# Patient Record
Sex: FEMALE | Race: WHITE | NOT HISPANIC OR LATINO | URBAN - NONMETROPOLITAN AREA
[De-identification: names, ages, dates, MRNs, and addresses within clinical notes are randomized per-mention and may not be internally consistent; named-entity substitution may affect disease eponyms.]

---

## 2017-12-18 ENCOUNTER — APPOINTMENT (RX ONLY)
Dept: URBAN - NONMETROPOLITAN AREA CLINIC 27 | Facility: CLINIC | Age: 57
Setting detail: DERMATOLOGY
End: 2017-12-18

## 2017-12-18 DIAGNOSIS — L71.0 PERIORAL DERMATITIS: ICD-10-CM | Status: INADEQUATELY CONTROLLED

## 2017-12-18 PROCEDURE — 99213 OFFICE O/P EST LOW 20 MIN: CPT

## 2017-12-18 PROCEDURE — ? PRESCRIPTION

## 2017-12-18 PROCEDURE — ? COUNSELING

## 2017-12-18 PROCEDURE — ? TREATMENT REGIMEN

## 2017-12-18 RX ORDER — MINOCYCLINE HYDROCHLORIDE 90 MG/1
CAPSULE, EXTENDED RELEASE ORAL
Qty: 30 | Refills: 1 | Status: ERX | COMMUNITY
Start: 2017-12-18

## 2017-12-18 RX ORDER — METRONIDAZOLE 7.5 MG/G
GEL TOPICAL
Qty: 1 | Refills: 1 | Status: ERX

## 2017-12-18 RX ADMIN — MINOCYCLINE HYDROCHLORIDE: 90 CAPSULE, EXTENDED RELEASE ORAL at 00:00

## 2017-12-18 ASSESSMENT — LOCATION SIMPLE DESCRIPTION DERM
LOCATION SIMPLE: LEFT CHEEK
LOCATION SIMPLE: CHIN
LOCATION SIMPLE: RIGHT CHEEK
LOCATION SIMPLE: UPPER LIP
LOCATION SIMPLE: LEFT CHEEK

## 2017-12-18 ASSESSMENT — LOCATION DETAILED DESCRIPTION DERM
LOCATION DETAILED: LEFT CENTRAL MALAR CHEEK
LOCATION DETAILED: LEFT MEDIAL BUCCAL CHEEK
LOCATION DETAILED: PHILTRUM
LOCATION DETAILED: RIGHT MEDIAL BUCCAL CHEEK
LOCATION DETAILED: RIGHT MENTAL CREASE

## 2017-12-18 ASSESSMENT — LOCATION ZONE DERM
LOCATION ZONE: FACE
LOCATION ZONE: FACE
LOCATION ZONE: LIP

## 2017-12-18 ASSESSMENT — INVESTIGATOR STATIC GLOBAL ASSESSMENT: IN YOUR EXPERIENCE, AMONG ALL PATIENTS YOU HAVE SEEN WITH THIS CONDITION, HOW SEVERE IS THIS PATIENT'S CONDITION?: MILD

## 2017-12-18 NOTE — PROCEDURE: TREATMENT REGIMEN
Initiate Treatment: Minocycline ER 90mg once daily for 2 months\\nmetrogel apply once daily
Otc Regimen: Dove, cetaphil, or CeraVe wash in the morning
Plan: Samples given until prescriptions from genrx come in due to the holidays.
Detail Level: Zone
Samples Given: Cetaphil \\Britney Ls wash face daily\\nSolodyn 80mg \\nNoritate cream apply daily at bedtime

## 2019-08-12 ENCOUNTER — APPOINTMENT (RX ONLY)
Dept: URBAN - NONMETROPOLITAN AREA CLINIC 27 | Facility: CLINIC | Age: 59
Setting detail: DERMATOLOGY
End: 2019-08-12

## 2019-08-12 DIAGNOSIS — L71.0 PERIORAL DERMATITIS: ICD-10-CM

## 2019-08-12 DIAGNOSIS — L29.89 OTHER PRURITUS: ICD-10-CM | Status: INADEQUATELY CONTROLLED

## 2019-08-12 DIAGNOSIS — L91.8 OTHER HYPERTROPHIC DISORDERS OF THE SKIN: ICD-10-CM

## 2019-08-12 DIAGNOSIS — L29.8 OTHER PRURITUS: ICD-10-CM | Status: INADEQUATELY CONTROLLED

## 2019-08-12 PROBLEM — L30.9 DERMATITIS, UNSPECIFIED: Status: ACTIVE | Noted: 2019-08-12

## 2019-08-12 PROCEDURE — ? COUNSELING

## 2019-08-12 PROCEDURE — ? TREATMENT REGIMEN

## 2019-08-12 PROCEDURE — ? PRESCRIPTION

## 2019-08-12 PROCEDURE — 99213 OFFICE O/P EST LOW 20 MIN: CPT

## 2019-08-12 RX ORDER — DOXYCYCLINE 50 MG/1
TABLET, FILM COATED ORAL
Qty: 90 | Refills: 1 | Status: ERX | COMMUNITY
Start: 2019-08-12

## 2019-08-12 RX ORDER — SALICYLIC ACID 3 G/100G
LOTION TOPICAL
Qty: 60 | Refills: 0 | Status: ERX | COMMUNITY
Start: 2019-08-12

## 2019-08-12 RX ORDER — IVERMECTIN 10 MG/G
CREAM TOPICAL
Qty: 1 | Refills: 1 | Status: ERX | COMMUNITY
Start: 2019-08-12

## 2019-08-12 RX ORDER — RANITIDINE 300 MG/1
TABLET, FILM COATED ORAL
Qty: 30 | Refills: 0 | Status: ERX | COMMUNITY
Start: 2019-08-12

## 2019-08-12 RX ADMIN — RANITIDINE: 300 TABLET, FILM COATED ORAL at 12:51

## 2019-08-12 RX ADMIN — IVERMECTIN: 10 CREAM TOPICAL at 12:53

## 2019-08-12 RX ADMIN — DOXYCYCLINE: 50 TABLET, FILM COATED ORAL at 12:42

## 2019-08-12 RX ADMIN — SALICYLIC ACID: 3 LOTION TOPICAL at 12:51

## 2019-08-12 ASSESSMENT — LOCATION DETAILED DESCRIPTION DERM
LOCATION DETAILED: RIGHT AXILLARY VAULT
LOCATION DETAILED: LEFT AXILLARY VAULT

## 2019-08-12 ASSESSMENT — LOCATION SIMPLE DESCRIPTION DERM
LOCATION SIMPLE: LEFT AXILLARY VAULT
LOCATION SIMPLE: RIGHT AXILLARY VAULT

## 2019-08-12 ASSESSMENT — LOCATION ZONE DERM: LOCATION ZONE: AXILLAE

## 2019-08-12 NOTE — PROCEDURE: TREATMENT REGIMEN
Detail Level: Detailed
Continue Regimen: Minicycline 90mg ER until finished then switch to Doxycycline 50mg once daily which is being prescribed today. Patient voices understanding
Initiate Treatment: Doxycycline 50mg, once daily x 90 days \\nTriple rosacea cream (azelaic acid, ivermectin, metronidazole) compound from [facility], apply to full face once daily x 90 days then as needed for flares
Plan: Discussed risks and benefits of removing skin tags by local anesthesia and cautery. Advised patient to wait until winter months to treat. Patient voices understanding
Detail Level: Zone
Initiate Treatment: Allegra 180mg, 8am and 3pm\\nZyrtec, QHS\\nRanitidine 300mg, take at 12 noon \\n\\nAdvised patient to follow this treatment regimen x 1 month

## 2021-12-14 DIAGNOSIS — Z01.419 ROUTINE GYNECOLOGICAL EXAMINATION: Primary | ICD-10-CM

## 2022-03-22 RX ORDER — ESCITALOPRAM OXALATE 20 MG/1
20 TABLET ORAL DAILY
COMMUNITY
End: 2023-04-10

## 2022-03-22 RX ORDER — LEVOTHYROXINE SODIUM 112 UG/1
112 TABLET ORAL
COMMUNITY

## 2022-03-22 RX ORDER — VERAPAMIL HYDROCHLORIDE 120 MG/1
120 TABLET, FILM COATED, EXTENDED RELEASE ORAL NIGHTLY
COMMUNITY
End: 2023-04-10

## 2022-03-22 NOTE — PROGRESS NOTES
"NEW PATIENT-  WELL WOMAN (menop/ hyst with BSO)  Patient Care Team:  Donte Thomas MD as PCP - General (Family Medicine)    HPI:  Patient is a 61 y.o. who presents for her well woman exam today.  History reviewed with patient.   Patient also has additional concerns she wants to discuss at this visit (see additional problem note below)    Her hyst was for bleeding in "years ago"  HRT:  has used sytemic estrogen hrt in the pastand testosterone and BioTe  HX ABNL PAPS: had cryo yrs ago but normal since    REVIEW OF PRIOR DATA/ HEALTH MAINTENANCE:  LAST ANNUAL/ [place]   [place]   LAST LABS- in the last year with pcp in [place]- thinks they checked thyroid and cholesterol   LAST MMG (screening)- -- normal at out of town facility in [place]    LAST COLONOSCOPY- in her late 30s but declines doing again as when her  did his his heart stopped so she is too anxious to do it again herself. Discussed Cologard and would like to do that and if it is positive will strongly consider colonoscopy       Past Medical History:   Diagnosis Date    Anemia     Anosmia     ever since Covid in dec 2020    Anxiety and depression     Benign paroxysmal vertigo, bilateral     Current smoker     Family history of breast cancer gene mutation in first degree relative     mom +mutation, breast ca at 32, mat aunt + for gene, breast ca at 40, sister neg for gene but +breast ca-patient has never been tested    history of endometriosis     Hypothyroid 3/23/2022    Hypothyroidism     Migraines     exertional-if she doesnt move too quickly she does fine off her verapamil     SURGICAL HX:   has a past surgical history that includes Cholecystectomy; Hernia repair; Bladder suspension; and Total abdominal hysterectomy w/ bilateral salpingoophorectomy.    SOCIAL HX:    reports that she has been smoking. She has never used smokeless tobacco. She reports current alcohol use. She reports that she does not use drugs.    FAMILY HX:   " family history includes Breast cancer in her sister; Breast cancer (age of onset: 32) in her mother; Breast cancer (age of onset: 40) in her maternal aunt; Esophageal cancer in her father. .    ALLERGIES:  Patient has no known allergies.    Current Outpatient Medications:     ascorbic acid, vitamin C, (VITAMIN C) 1000 MG tablet, Take 1,000 mg by mouth once daily., Disp: , Rfl:     cyanocobalamin, vitamin B-12, (B-12 COMPLIANCE) 1,000 mcg/mL Kit, , Disp: , Rfl:     EScitalopram oxalate (LEXAPRO) 20 MG tablet, Take 20 mg by mouth once daily., Disp: , Rfl:     etodolac (LODINE) 400 MG tablet, Take 400 mg by mouth 2 (two) times daily., Disp: , Rfl:     fluticasone propionate (FLONASE) 50 mcg/actuation nasal spray, 1 spray by Each Nostril route once daily., Disp: , Rfl:     levothyroxine (SYNTHROID) 112 MCG tablet, Take 112 mcg by mouth before breakfast., Disp: , Rfl:     melatonin 10 mg Tab, Take by mouth., Disp: , Rfl:     vitamin D (VITAMIN D3) 1000 units Tab, Take 1,000 Units by mouth once daily., Disp: , Rfl:     zinc gluconate 50 mg tablet, Take 50 mg by mouth once daily., Disp: , Rfl:     clotrimazole-betamethasone 1-0.05% (LOTRISONE) cream, Apply topically 2 (two) times daily. for 14 days, Disp: 45 g, Rfl: 1    fluconazole (DIFLUCAN) 200 MG Tab, Take 1 tablet (200 mg total) by mouth once daily. for 3 days, Disp: 3 tablet, Rfl: 0    loratadine (CLARITIN) 10 mg tablet, , Disp: , Rfl:     verapamiL (CALAN-SR) 120 MG CR tablet, Take 120 mg by mouth every evening., Disp: , Rfl:     ROS:  CONST:  No fever, chills, or unexpected changes in weight,  +hair loss +fatigue +excessive sleepiness  CV: No chest pain or palpitations  RESP:  No shortness of breath or cough  GI: No abd pain, vomiting, diarrhea, blood in stool, or changes in bowel mvmts  SKIN: No rashes or lesions  MUSCULOSKELETAL: No joint swelling or pain but +myalgias and + pain in hips and feet if she is active  PSYCH: No changes in mood or  "insomia, +mild depression symptoms  BREASTS: No asymmetry, lumps, pain, nipple discharge, or skin changes  :  No dysuria, urgency, frequency, hematuria or incontinence          No vag dc, itching, odor or dryness          No pelvic pain, dyspareunia, or abnormal vaginal bleeding    VITALS:  Blood pressure 117/76, pulse 79, height 5' 8" (1.727 m), weight 119.9 kg (264 lb 6.4 oz).  Body mass index is 40.2 kg/m².     PHYSICAL EXAM-  APPEARANCE: Well appearing, in no acute distress.  NECK: Neck symmetric without masses or thyromegaly.  CV:  Normal rate  PULM: Normal resp rate, no resp distress, normal resp effort  PSYCH:  Normal mood and affect, cooperative  SKIN: No rashes, lesions, or abnormal bruising  LYMPH: No inguinal or axillary adenopathy  ABD: Soft, without tenderness or masses. No palpable hernias or hsm  BREASTS: Symmetrical, no skin changes or lesions. No palpable masses, tenderness, or nipple dc  PELVIC:  VULVA: No lesions. Normal female genitalia.  URETHRAL MEATUS: No masses, no prolapse.  BLADDER/ URETHRA: No masses or suprapubic tenderness  VAGINA/ CUFF: No discharge, erythema, or lesions. No significant cystocele/ rectocele. +atrophic changes  PELVIS: No masses, tenderness, or fullness on bimanual exam  ANUS/ PERINEUM: Normal tone.  No lesions.  *female chaperone present for entire exam    ASSESSMENT and PLAN:  Encounter for well woman exam with routine gynecological exam  -     Liquid-based pap smear, screening    Breast cancer screening by mammogram  -     Mammo Digital Screening Bilat w/ Barney; Future; Expected date: 04/06/2022    Menopausal state    Routine gynecological examination  -     Ambulatory referral/consult to Obstetrics / Gynecology    Screening for colon cancer  -     Cologuard Screening (Multitarget Stool DNA); Future; Expected date: 04/06/2022    FOLLOWUP:  1 year for wwe or sooner prn    COUNSELING:  Patient was counseled today on recommendations for yearly pelvic exam, current Pap " guidelines, self breast exams, annual screening mammograms, routine screening colonoscopy, and screening bone density. Reviewed calcium and vitamin D supplements and weight bearing exercise to minimize risks.  Encouraged patient to see her PCP for other health maintenance.     PROBLEM VISIT:  Problem HPI/ROS:              -itching of vulvar area for last several weeks , has been sweating a lot   - patient's mom and aunt with breast ca gene, sister neg but got breast ca at 40. Pt has never been tested because she thought she was doing everything possible for prevention by getting her yearly mmgs.Discussed multiple other additional means to decrease risk if she is positive and gave [facility] info and pt will consider    comp cream at [facility] estratest   -Fatigue and depression since she has moved here to [place]- has had a lot of stress in her family and it is improving. But fatigue and excessive sleepiness is making her not motivated to do anything.  She now always takes at least one nap a day and  Still isnt rested. Feels like she gets enough sleep at night that she shouldn't feel this tired. Doesn't know if she maybe needs to check hormones. Did BioTe a few times over last 2 yrs and helped energy some (last time was 9/2021). Before that had used compounded estratest and that helped some too.  Discussed indications for hrt and options and wants to try compounded estratest cream at [facility] while getting fatigue workup   -chronic and worsening pain in hips and feet making it hard for her to do normal functions. Has call into pcp to be evaluated for this   - never got her smell back and taste still not back to normal after covid.               -hair loss has become worse    Problem PHYS EXAM:  (pertinent findings noted in PHYS EXAM above)     Problem ASSESMENT and PLAN:  Yeast dermatitis  -     fluconazole (DIFLUCAN) 200 MG Tab; Take 1 tablet (200 mg total) by mouth once daily. for 3 days  Dispense: 3 tablet; Refill: 0  -      clotrimazole-betamethasone 1-0.05% (LOTRISONE) cream; Apply topically 2 (two) times daily. for 14 days  Dispense: 45 g; Refill: 1    Fatigue- interested in trying compounded estratest cream at [facility] if normal mmg    Excessive sleepiness  Hair loss  Myalgias  Depression due to life stressors- on lexapro and no severe symptoms  Hypothyroidism   -is going to get labwork done at pcps office at upcoming appt to further workup these things- some of which could be related to hx of covid    Family history of breast cancer gene mutation in first degree relative   - counseled and info on [facility] given and pt considering    Current smoker, not currently interested in quitting    *Total time spent: 40 min. 50 % or more was spent on counseling about diagnosis, treatment options, and coordination of care.

## 2022-03-23 ENCOUNTER — OFFICE VISIT (OUTPATIENT)
Dept: OBSTETRICS AND GYNECOLOGY | Facility: CLINIC | Age: 62
End: 2022-03-23
Payer: COMMERCIAL

## 2022-03-23 VITALS
HEART RATE: 79 BPM | DIASTOLIC BLOOD PRESSURE: 76 MMHG | WEIGHT: 264.38 LBS | SYSTOLIC BLOOD PRESSURE: 117 MMHG | BODY MASS INDEX: 40.07 KG/M2 | HEIGHT: 68 IN

## 2022-03-23 DIAGNOSIS — Z01.419 ROUTINE GYNECOLOGICAL EXAMINATION: ICD-10-CM

## 2022-03-23 DIAGNOSIS — Z12.31 BREAST CANCER SCREENING BY MAMMOGRAM: ICD-10-CM

## 2022-03-23 DIAGNOSIS — Z12.11 SCREENING FOR COLON CANCER: ICD-10-CM

## 2022-03-23 DIAGNOSIS — F32.A DEPRESSION, UNSPECIFIED DEPRESSION TYPE: ICD-10-CM

## 2022-03-23 DIAGNOSIS — L65.9 HAIR LOSS: ICD-10-CM

## 2022-03-23 DIAGNOSIS — F17.200 CURRENT SMOKER: ICD-10-CM

## 2022-03-23 DIAGNOSIS — B37.2 YEAST DERMATITIS: ICD-10-CM

## 2022-03-23 DIAGNOSIS — Z01.419 ENCOUNTER FOR WELL WOMAN EXAM WITH ROUTINE GYNECOLOGICAL EXAM: Primary | ICD-10-CM

## 2022-03-23 DIAGNOSIS — N95.1 MENOPAUSAL STATE: ICD-10-CM

## 2022-03-23 DIAGNOSIS — M79.10 MYALGIA: ICD-10-CM

## 2022-03-23 DIAGNOSIS — Z84.81 FAMILY HISTORY OF BREAST CANCER GENE MUTATION IN FIRST DEGREE RELATIVE: ICD-10-CM

## 2022-03-23 DIAGNOSIS — R53.83 FATIGUE, UNSPECIFIED TYPE: ICD-10-CM

## 2022-03-23 DIAGNOSIS — G47.10 EXCESSIVE SLEEPINESS: ICD-10-CM

## 2022-03-23 DIAGNOSIS — E03.9 HYPOTHYROIDISM, UNSPECIFIED TYPE: ICD-10-CM

## 2022-03-23 PROCEDURE — 99386 PREV VISIT NEW AGE 40-64: CPT | Mod: S$GLB,,, | Performed by: OBSTETRICS & GYNECOLOGY

## 2022-03-23 PROCEDURE — 99204 OFFICE O/P NEW MOD 45 MIN: CPT | Mod: 25,S$GLB,, | Performed by: OBSTETRICS & GYNECOLOGY

## 2022-03-23 PROCEDURE — 99204 PR OFFICE/OUTPT VISIT, NEW, LEVL IV, 45-59 MIN: ICD-10-PCS | Mod: 25,S$GLB,, | Performed by: OBSTETRICS & GYNECOLOGY

## 2022-03-23 PROCEDURE — 99386 PR PREVENTIVE VISIT,NEW,40-64: ICD-10-PCS | Mod: S$GLB,,, | Performed by: OBSTETRICS & GYNECOLOGY

## 2022-03-23 RX ORDER — FLUTICASONE PROPIONATE 50 MCG
SPRAY, SUSPENSION (ML) NASAL
COMMUNITY
End: 2022-03-23

## 2022-03-23 RX ORDER — ETODOLAC 400 MG/1
400 TABLET, FILM COATED ORAL 2 TIMES DAILY
COMMUNITY
End: 2023-04-10

## 2022-03-23 RX ORDER — CYANOCOBALAMIN, ISOPROPYL ALCOHOL 1000MCG/ML
KIT INJECTION
COMMUNITY

## 2022-03-23 RX ORDER — ACETAMINOPHEN, DIPHENHYDRAMINE HCL, PHENYLEPHRINE HCL 325; 25; 5 MG/1; MG/1; MG/1
TABLET ORAL
COMMUNITY

## 2022-03-23 RX ORDER — LORATADINE 10 MG/1
TABLET ORAL
COMMUNITY

## 2022-03-23 RX ORDER — CLOTRIMAZOLE AND BETAMETHASONE DIPROPIONATE 10; .64 MG/G; MG/G
CREAM TOPICAL 2 TIMES DAILY
Qty: 45 G | Refills: 1 | Status: SHIPPED | OUTPATIENT
Start: 2022-03-23 | End: 2022-04-06

## 2022-03-23 RX ORDER — IBUPROFEN 100 MG/5ML
1000 SUSPENSION, ORAL (FINAL DOSE FORM) ORAL DAILY
COMMUNITY

## 2022-03-23 RX ORDER — CHOLECALCIFEROL (VITAMIN D3) 25 MCG
1000 TABLET ORAL DAILY
COMMUNITY

## 2022-03-23 RX ORDER — ZINC GLUCONATE 50 MG
50 TABLET ORAL DAILY
COMMUNITY

## 2022-03-23 RX ORDER — FLUCONAZOLE 200 MG/1
200 TABLET ORAL DAILY
Qty: 3 TABLET | Refills: 0 | Status: SHIPPED | OUTPATIENT
Start: 2022-03-23 | End: 2022-03-26

## 2022-03-23 RX ORDER — FLUTICASONE PROPIONATE 50 MCG
1 SPRAY, SUSPENSION (ML) NASAL DAILY
COMMUNITY

## 2022-04-19 LAB — NONINV COLON CA DNA+OCC BLD SCRN STL QL: POSITIVE

## 2022-04-24 DIAGNOSIS — R19.5 POSITIVE COLORECTAL CANCER SCREENING USING COLOGUARD TEST: Primary | ICD-10-CM

## 2022-04-25 ENCOUNTER — TELEPHONE (OUTPATIENT)
Dept: OBSTETRICS AND GYNECOLOGY | Facility: CLINIC | Age: 62
End: 2022-04-25
Payer: COMMERCIAL

## 2022-04-25 DIAGNOSIS — Z12.11 SCREENING FOR COLON CANCER: Primary | ICD-10-CM

## 2022-04-25 DIAGNOSIS — R19.5 POSITIVE FECAL OCCULT BLOOD TEST: ICD-10-CM

## 2022-04-25 NOTE — TELEPHONE ENCOUNTER
----- Message from Mandy Elizabeth MD sent at 4/24/2022  8:57 PM CDT -----  Please let pt know her cologard test came back positive which means she needs to do a colonoscopy to evaluate that further. I will place and order for dr hadley but if she wished to see someone else let me know. A positive cologard does not necessarily means she has cancer. The cologard test also detects abnormal cells from precancerous lesions like polyps which are pretty common, but need to be removed. But once she gets her colonoscopy we will know more

## 2022-04-25 NOTE — TELEPHONE ENCOUNTER
Spoke with patient. Two pt identifiers confirmed. Notified patient of positive Cologard results, and the need for a colonoscopy. An order has been placed and will be faxed to Dr. Lange. Patient verbalized understanding.

## 2022-04-25 NOTE — PROGRESS NOTES
Please let pt know her cologard test came back positive which means she needs to do a colonoscopy to evaluate that further. I will place and order for dr hadley but if she wished to see someone else let me know. A positive cologard does not necessarily means she has cancer. The cologard test also detects abnormal cells from precancerous lesions like polyps which are pretty common, but need to be removed. But once she gets her colonoscopy we will know more

## 2022-05-16 VITALS — BODY MASS INDEX: 38.95 KG/M2 | WEIGHT: 257 LBS | HEIGHT: 68 IN

## 2022-05-16 DIAGNOSIS — R19.5 FECAL OCCULT BLOOD TEST POSITIVE: ICD-10-CM

## 2022-05-16 DIAGNOSIS — R19.5 POSITIVE COLORECTAL CANCER SCREENING USING COLOGUARD TEST: Primary | ICD-10-CM

## 2022-05-16 NOTE — TELEPHONE ENCOUNTER
Updated chart with patient. Referral is for POBT.- She understands facility will contact her with a time to arrive-JAZZMINE

## 2022-05-16 NOTE — TELEPHONE ENCOUNTER
----- Message from Hawa Ponce sent at 5/11/2022  8:47 AM CDT -----  Regarding: Direct Schedule  Referring  Office called to make sure referral received and patient would be called to get scheduled.  Please call patient to direct schedule. Thanks

## 2022-05-17 RX ORDER — SOD SULF/POT CHLORIDE/MAG SULF 1.479 G
12 TABLET ORAL DAILY
Qty: 24 TABLET | Refills: 0 | Status: SHIPPED | OUTPATIENT
Start: 2022-05-17 | End: 2023-04-11

## 2022-05-20 ENCOUNTER — TELEPHONE (OUTPATIENT)
Dept: GASTROENTEROLOGY | Facility: CLINIC | Age: 62
End: 2022-05-20
Payer: COMMERCIAL

## 2022-05-20 DIAGNOSIS — R19.5 POSITIVE COLORECTAL CANCER SCREENING USING COLOGUARD TEST: Primary | ICD-10-CM

## 2022-05-20 NOTE — TELEPHONE ENCOUNTER
"[facility]  [address] [place] [address]  Phone: [phone]  Fax: [phone]    History & Physical         Provider: Dr. Yarely Lange    Patient Name: Kelly GLEZ (age):1960  61 y.o.           Gender: female   Phone: [phone]     Referring Physician: Donte Thomas     Vital Signs:   Height - 5'8"  Weight - 257lb   BMI -  39.08    Plan: Colonoscopy     Encounter Diagnosis   Name Primary?    Positive colorectal cancer screening using Cologuard test Yes           History:      Past Medical History:   Diagnosis Date    Anemia     Anosmia     ever since Covid in dec 2020    Anxiety and depression     Benign paroxysmal vertigo, bilateral     BMI 39.0-39.9,adult     Current smoker     Family history of breast cancer gene mutation in first degree relative     mom +mutation, breast ca at 32, mat aunt + for gene, breast ca at 40, sister neg for gene but +breast ca-patient has never been tested    history of endometriosis     Hypothyroid 2022    Hypothyroidism     Migraines     exertional-if she doesnt move too quickly she does fine off her verapamil    RU (obstructive sleep apnea)     CPAP- setting 5      Past Surgical History:   Procedure Laterality Date    BLADDER SUSPENSION      CHOLECYSTECTOMY      HERNIA REPAIR      TOTAL ABDOMINAL HYSTERECTOMY W/ BILATERAL SALPINGOOPHORECTOMY      for bleeding and endo- with bso yrs ago- has used estratest and biote in past      Medication List with Changes/Refills   Current Medications    ASCORBIC ACID, VITAMIN C, (VITAMIN C) 1000 MG TABLET    Take 1,000 mg by mouth once daily.    CYANOCOBALAMIN, VITAMIN B-12, (B-12 COMPLIANCE) 1,000 MCG/ML KIT        ESCITALOPRAM OXALATE (LEXAPRO) 20 MG TABLET    Take 20 mg by mouth once daily.    ETODOLAC (LODINE) 400 MG TABLET    Take 400 mg by mouth 2 (two) times daily.    FLUTICASONE " PROPIONATE (FLONASE) 50 MCG/ACTUATION NASAL SPRAY    1 spray by Each Nostril route once daily.    LEVOTHYROXINE (SYNTHROID) 112 MCG TABLET    Take 112 mcg by mouth before breakfast.    LORATADINE (CLARITIN) 10 MG TABLET        MELATONIN 10 MG TAB    Take by mouth.    SOD SULF-POT CHLORIDE-MAG SULF (SUTAB) 1.479-0.188- 0.225 GRAM TABLET    Take 12 tablets by mouth once daily. Take according to package instructions with indicated amount of water. No breakfast day before test.    VERAPAMIL (CALAN-SR) 120 MG CR TABLET    Take 120 mg by mouth every evening.    VITAMIN D (VITAMIN D3) 1000 UNITS TAB    Take 1,000 Units by mouth once daily.    ZINC GLUCONATE 50 MG TABLET    Take 50 mg by mouth once daily.      Review of patient's allergies indicates:  No Known Allergies   Family History   Problem Relation Age of Onset    Breast cancer Mother 32        + for breast ca mutation    Esophageal cancer Father     Breast cancer Sister         neg for breast ca mutation    Breast cancer Maternal Aunt 40        + for breast ca mutation      Social History     Tobacco Use    Smoking status: Current Every Day Smoker    Smokeless tobacco: Never Used   Substance Use Topics    Alcohol use: Yes     Comment: mixed drinks 2x week    Drug use: Never        Physical Examination:     General Appearance:___________________________  HEENT: _____________________________________  Abdomen:____________________________________  Heart:________________________________________  Lungs:_______________________________________  Extremities:___________________________________  Skin:_________________________________________  Endocrine:____________________________________  Genitourinary:_________________________________  Neurological:__________________________________      Patient has been evaluated immediately prior to sedation and is medically cleared for endoscopy with IVCS as an ASA class: ______      Physician Signature: _________________________        Date: ________  Time: ________

## 2022-05-23 ENCOUNTER — TELEPHONE (OUTPATIENT)
Dept: GASTROENTEROLOGY | Facility: CLINIC | Age: 62
End: 2022-05-23
Payer: COMMERCIAL

## 2022-05-23 DIAGNOSIS — R19.5 POSITIVE FECAL OCCULT BLOOD TEST: Primary | ICD-10-CM

## 2022-05-23 NOTE — TELEPHONE ENCOUNTER
"[facility]  [address] [place] [address]  Phone: [phone]  Fax: [phone]    History & Physical         Provider: Dr. Yarely Lange    Patient Name: Kelly GLEZ (age):1960  61 y.o.           Gender: female   Phone: [phone]     Referring Physician: Donte Thomas     Vital Signs:   Height -  5'8"  Weight -  257 lb  BMI -  39.08    Plan: Colonoscopy     Encounter Diagnosis   Name Primary?    Positive fecal occult blood test Yes           History:      Past Medical History:   Diagnosis Date    Anemia     Anosmia     ever since Covid in dec 2020    Anxiety and depression     Benign paroxysmal vertigo, bilateral     BMI 39.0-39.9,adult     Current smoker     Family history of breast cancer gene mutation in first degree relative     mom +mutation, breast ca at 32, mat aunt + for gene, breast ca at 40, sister neg for gene but +breast ca-patient has never been tested    history of endometriosis     Hypothyroid 2022    Hypothyroidism     Migraines     exertional-if she doesnt move too quickly she does fine off her verapamil    RU (obstructive sleep apnea)     CPAP- setting 5      Past Surgical History:   Procedure Laterality Date    BLADDER SUSPENSION      CHOLECYSTECTOMY      HERNIA REPAIR      TOTAL ABDOMINAL HYSTERECTOMY W/ BILATERAL SALPINGOOPHORECTOMY      for bleeding and endo- with bso yrs ago- has used estratest and biote in past      Medication List with Changes/Refills   Current Medications    ASCORBIC ACID, VITAMIN C, (VITAMIN C) 1000 MG TABLET    Take 1,000 mg by mouth once daily.    CYANOCOBALAMIN, VITAMIN B-12, (B-12 COMPLIANCE) 1,000 MCG/ML KIT        ESCITALOPRAM OXALATE (LEXAPRO) 20 MG TABLET    Take 20 mg by mouth once daily.    ETODOLAC (LODINE) 400 MG TABLET    Take 400 mg by mouth 2 (two) times daily.    FLUTICASONE PROPIONATE (FLONASE) 50 " MCG/ACTUATION NASAL SPRAY    1 spray by Each Nostril route once daily.    LEVOTHYROXINE (SYNTHROID) 112 MCG TABLET    Take 112 mcg by mouth before breakfast.    LORATADINE (CLARITIN) 10 MG TABLET        MELATONIN 10 MG TAB    Take by mouth.    SOD SULF-POT CHLORIDE-MAG SULF (SUTAB) 1.479-0.188- 0.225 GRAM TABLET    Take 12 tablets by mouth once daily. Take according to package instructions with indicated amount of water. No breakfast day before test.    VERAPAMIL (CALAN-SR) 120 MG CR TABLET    Take 120 mg by mouth every evening.    VITAMIN D (VITAMIN D3) 1000 UNITS TAB    Take 1,000 Units by mouth once daily.    ZINC GLUCONATE 50 MG TABLET    Take 50 mg by mouth once daily.      Review of patient's allergies indicates:  No Known Allergies   Family History   Problem Relation Age of Onset    Breast cancer Mother 32        + for breast ca mutation    Esophageal cancer Father     Breast cancer Sister         neg for breast ca mutation    Breast cancer Maternal Aunt 40        + for breast ca mutation      Social History     Tobacco Use    Smoking status: Current Every Day Smoker    Smokeless tobacco: Never Used   Substance Use Topics    Alcohol use: Yes     Comment: mixed drinks 2x week    Drug use: Never        Physical Examination:     General Appearance:___________________________  HEENT: _____________________________________  Abdomen:____________________________________  Heart:________________________________________  Lungs:_______________________________________  Extremities:___________________________________  Skin:_________________________________________  Endocrine:____________________________________  Genitourinary:_________________________________  Neurological:__________________________________      Patient has been evaluated immediately prior to sedation and is medically cleared for endoscopy with IVCS as an ASA class: ______      Physician Signature: _________________________       Date: ________   Time: ________

## 2023-04-10 NOTE — PROGRESS NOTES
"CC:  WELL WOMAN (menop/ hyst with BSO)  Patient Care Team:  Donte Thomas MD as PCP - General (Family Medicine)  Yarely Lange MD as Consulting Physician (Gastroenterology)    Last visit with me was on  3/23/2022 for wwe    HPI:  Patient is a 62 y.o. who presents for her well woman exam today.  History reviewed with patient.   Patient also has additional concerns she wants to discuss at this visit (see additional problem note below)    Her hyst was for bleeding and endometriosis in "years ago"  HRT:   has used estratest and Biote in the past  HX ABNL PAPS: normal after cryo -     yrs ago    REVIEW OF PRIOR DATA/ HEALTH MAINTENANCE:  LAST ANNUAL:   2022    LAST MMG (screening)- 2022- [facility] at [facility]           Past Medical History:   Diagnosis Date    Anemia     Anosmia     ever since Covid in dec 2020    Anxiety and depression     Benign paroxysmal vertigo, bilateral     BMI 39.0-39.9,adult     Current smoker     Family history of breast cancer gene mutation in first degree relative     mom +mutation, breast ca at 32, mat aunt + for gene, breast ca at 40, sister neg for gene but +breast ca-patient has never been tested    history of endometriosis     Hypothyroid 2022    Hypothyroidism     Migraines     exertional-if she doesnt move too quickly she does fine off her verapamil    RU (obstructive sleep apnea)     CPAP- setting 5     SURGICAL HX:   has a past surgical history that includes Cholecystectomy; Hernia repair; Bladder suspension; and Total abdominal hysterectomy w/ bilateral salpingoophorectomy.    SOCIAL HX:    reports that she has been smoking. She has never used smokeless tobacco. She reports current alcohol use. She reports that she does not use drugs.    FAMILY HX:   family history includes Breast cancer in her sister; Breast cancer (age of onset: 32) in her mother; Breast cancer (age of onset: 40) in her maternal aunt; Esophageal cancer in her " "father. .    ALLERGIES:  Patient has no known allergies.    Current Outpatient Medications   Medication Instructions    ascorbic acid (vitamin C) (VITAMIN C) 1,000 mg, Oral, Daily    cyanocobalamin, vitamin B-12, (B-12 COMPLIANCE) 1,000 mcg/mL Kit No dose, route, or frequency recorded.    DULoxetine (CYMBALTA) 60 MG capsule No dose, route, or frequency recorded.    fluticasone propionate (FLONASE) 50 mcg/actuation nasal spray 1 spray, Each Nostril, Daily    levothyroxine (SYNTHROID) 150 MCG tablet TAKE 1 TABLET BY MOUTH ONCE DAILY ONE HOUR BEFORE A MEAL    levothyroxine (SYNTHROID) 112 mcg, Oral, Before breakfast    loratadine (CLARITIN) 10 mg tablet No dose, route, or frequency recorded.    melatonin 10 mg Tab Oral    verapamiL (CALAN-SR) 120 MG CR tablet verapamil ER (SR) 120 mg tablet,extended release   Take 1 tablet every day by oral route.    vitamin D (VITAMIN D3) 1,000 Units, Oral, Daily    zinc gluconate 50 mg, Oral, Daily     ROS:  CONST:  No fever, chills, fatigue or unexpected changes in weight   CV: No chest pain or palpitations   RESP:  No shortness of breath or cough   GI: No abd pain, vomiting, diarrhea, blood in stool, or changes in bowel mvmts   SKIN: No rashes or lesions  MUSCULOSKELETAL: No joint swelling or pain   PSYCH: No changes in mood or insomia   BREASTS: No asymmetry, lumps, pain, nipple discharge, or skin changes   :  No dysuria, urgency, frequency, hematuria or incontinence           No vag dc, itching, odor or dryness           No pelvic pain, dyspareunia, or abnormal vaginal bleeding     VITALS:  Blood pressure 128/86, height 5' 8" (1.727 m), weight 118.8 kg (262 lb).  Body mass index is 39.84 kg/m².     PHYSICAL EXAM-  APPEARANCE: Well appearing, in no acute distress.   NECK: Neck symmetric   CV:  Normal rate   PULM: Normal resp rate, no resp distress, normal resp effort   PSYCH:  Normal mood and affect, cooperative   SKIN: No rashes, lesions, or abnormal bruising   LYMPH: No " inguinal or axillary adenopathy   ABD: Soft, without tenderness or masses.    BREAST: Symmetrical, no nipple changes, no skin changes, No palpable masses   PELVIC:  VULVA: Normal female genitalia. No lesions.   URETHRAL MEATUS: No masses, no significant prolapse.  BLADDER/ URETHRA: No masses or suprapubic tenderness   VAGINA/ CUFF: No lesions. +atrophic changes. No discharge   PELVIS: No masses, tenderness, or fullness on bimanual exam   ANUS/ PERINEUM: Normal tone.  No lesions.     *female chaperone present for entire exam    ASSESSMENT and PLAN:  Encounter for well woman exam with routine gynecological exam  -     Liquid-based pap smear, screening    Breast cancer screening by mammogram  -     Mammo Digital Screening Bilat w/ Barney; Future; Expected date: 04/24/2023    Encounter for osteoporosis screening in asymptomatic postmenopausal patient  -     DXA Bone Density Axial Skeleton 1 or more sites; Future; Expected date: 04/24/2023    Screening for colon cancer  -     Ambulatory referral/consult to Gastroenterology; Future; Expected date: 04/18/2023       FOLLOWUP:  1 year for wwe or sooner prn    COUNSELING:  Patient was counseled today on recommendations for yearly pelvic exam, current Pap guidelines, self breast exams, annual screening mammograms, routine screening colonoscopy, and screening bone density. Reviewed calcium and vitamin D supplements and weight bearing exercise to minimize risks.  Encouraged patient to see her PCP for other health maintenance.    PROBLEM VISIT:  Problem HPI/ROS:         Pt wants to discuss hrt and has been on some type of hrt since hyst and most recently was on Biote pellets. Last pellet placed about a yr ago and stopped them bc it went too deep and gave her a permanent lump where inserted. She is sure if she should try hrt again. Discussed testosterone/ CV risks and doesn't feel like she needs that. Her biggest symptom is she feels hot all the time, leigh at night. Overall energy is  much improved once thyroid increased a few mos ago and cymbalta adjusted. Discussed a trial of estradiol in a lower dose and see if she gets any benefit from that and if so can use that and then try to wean off in future.  Pt also had a bladder lift at time of hyst yrs ago and feels like the last couple yrs it has fallen and now back to wearing pads everyday. +incontinence with coughing, sneezing, getting up out of bed, etc No urge symptoms No uti symptoms. Interested in seeing a urologist to discuss options   Had to reschedule colonoscopy and pt says never heard back from gi to r/s       Problem PHYS EXAM:  (pertinent findings noted in PHYS EXAM above)     Problem ASSESMENT and PLAN:    KOFI (stress urinary incontinence, female)  -     Ambulatory referral/consult to Urology; Future; Expected date: 04/18/2023    Menopausal syndrome (hot flashes)    - gave 2 boxes of Divigel .5mg samples to try and if helpful, will send out estradiolo.5mg. If not, pt will remain off further hrt    *Total time spent: 40 min. 50 % or more was spent on counseling about diagnosis, treatment options, and coordination of care.

## 2023-04-11 ENCOUNTER — OFFICE VISIT (OUTPATIENT)
Dept: OBSTETRICS AND GYNECOLOGY | Facility: CLINIC | Age: 63
End: 2023-04-11
Payer: COMMERCIAL

## 2023-04-11 VITALS
SYSTOLIC BLOOD PRESSURE: 128 MMHG | WEIGHT: 262 LBS | BODY MASS INDEX: 39.71 KG/M2 | HEIGHT: 68 IN | DIASTOLIC BLOOD PRESSURE: 86 MMHG

## 2023-04-11 DIAGNOSIS — Z13.820 ENCOUNTER FOR OSTEOPOROSIS SCREENING IN ASYMPTOMATIC POSTMENOPAUSAL PATIENT: ICD-10-CM

## 2023-04-11 DIAGNOSIS — Z78.0 ENCOUNTER FOR OSTEOPOROSIS SCREENING IN ASYMPTOMATIC POSTMENOPAUSAL PATIENT: ICD-10-CM

## 2023-04-11 DIAGNOSIS — N95.1 MENOPAUSAL SYNDROME (HOT FLASHES): ICD-10-CM

## 2023-04-11 DIAGNOSIS — N39.3 SUI (STRESS URINARY INCONTINENCE, FEMALE): ICD-10-CM

## 2023-04-11 DIAGNOSIS — Z12.11 SCREENING FOR COLON CANCER: ICD-10-CM

## 2023-04-11 DIAGNOSIS — Z01.419 ENCOUNTER FOR WELL WOMAN EXAM WITH ROUTINE GYNECOLOGICAL EXAM: Primary | ICD-10-CM

## 2023-04-11 DIAGNOSIS — Z12.31 BREAST CANCER SCREENING BY MAMMOGRAM: ICD-10-CM

## 2023-04-11 PROBLEM — R05.9 COUGH: Status: ACTIVE | Noted: 2020-12-21

## 2023-04-11 PROBLEM — R42 DIZZINESS AND GIDDINESS: Status: ACTIVE | Noted: 2017-11-21

## 2023-04-11 PROBLEM — H53.8 NEGATIVE DYSPHOTOPSIA: Status: ACTIVE | Noted: 2017-01-13

## 2023-04-11 PROBLEM — L20.9 ATOPIC DERMATITIS: Status: ACTIVE | Noted: 2019-12-05

## 2023-04-11 PROBLEM — Z79.890 HORMONE REPLACEMENT THERAPY: Status: ACTIVE | Noted: 2018-09-26

## 2023-04-11 PROBLEM — E66.9 OBESITY WITH BODY MASS INDEX 30 OR GREATER: Status: ACTIVE | Noted: 2021-04-19

## 2023-04-11 PROBLEM — H90.2: Status: ACTIVE | Noted: 2021-02-08

## 2023-04-11 PROBLEM — M72.2 PLANTAR FASCIITIS OF LEFT FOOT: Status: ACTIVE | Noted: 2021-11-12

## 2023-04-11 PROBLEM — R73.9 HYPERGLYCEMIA: Status: ACTIVE | Noted: 2017-01-13

## 2023-04-11 PROBLEM — H59.099 NEGATIVE DYSPHOTOPSIA: Status: ACTIVE | Noted: 2017-01-13

## 2023-04-11 PROBLEM — G44.84 BENIGN EXERTIONAL HEADACHE: Status: ACTIVE | Noted: 2021-04-19

## 2023-04-11 PROBLEM — R00.2 PALPITATIONS: Status: ACTIVE | Noted: 2018-10-19

## 2023-04-11 PROBLEM — R19.7 DIARRHEA: Status: ACTIVE | Noted: 2019-08-27

## 2023-04-11 PROBLEM — M54.17 LUMBOSACRAL RADICULOPATHY: Status: ACTIVE | Noted: 2021-04-19

## 2023-04-11 PROBLEM — F41.9 ANXIETY DISORDER: Status: ACTIVE | Noted: 2018-09-26

## 2023-04-11 PROCEDURE — 99215 PR OFFICE/OUTPT VISIT, EST, LEVL V, 40-54 MIN: ICD-10-PCS | Mod: 25,S$GLB,, | Performed by: OBSTETRICS & GYNECOLOGY

## 2023-04-11 PROCEDURE — 99215 OFFICE O/P EST HI 40 MIN: CPT | Mod: 25,S$GLB,, | Performed by: OBSTETRICS & GYNECOLOGY

## 2023-04-11 PROCEDURE — 99396 PREV VISIT EST AGE 40-64: CPT | Mod: S$GLB,,, | Performed by: OBSTETRICS & GYNECOLOGY

## 2023-04-11 PROCEDURE — 99396 PR PREVENTIVE VISIT,EST,40-64: ICD-10-PCS | Mod: S$GLB,,, | Performed by: OBSTETRICS & GYNECOLOGY

## 2023-04-11 RX ORDER — VERAPAMIL HYDROCHLORIDE 120 MG/1
TABLET, FILM COATED, EXTENDED RELEASE ORAL
COMMUNITY

## 2023-04-11 RX ORDER — LEVOTHYROXINE SODIUM 150 UG/1
TABLET ORAL
COMMUNITY
Start: 2023-02-06

## 2023-04-11 RX ORDER — DULOXETIN HYDROCHLORIDE 60 MG/1
CAPSULE, DELAYED RELEASE ORAL
COMMUNITY
Start: 2023-04-10

## 2023-04-15 LAB — Lab: NORMAL

## 2023-04-28 RX ORDER — ESTRADIOL 0.5 MG/.5G
1 GEL TOPICAL DAILY
Qty: 30 PACKET | Refills: 12 | Status: SHIPPED | OUTPATIENT
Start: 2023-04-28 | End: 2024-04-27

## 2023-04-28 NOTE — TELEPHONE ENCOUNTER
Allison had her annual earlier this month. She is liking the Divigel .5 that you put her on. She would like a script sent out.

## 2023-04-28 NOTE — TELEPHONE ENCOUNTER
----- Message from Kaykay Jose J sent at 4/28/2023  8:19 AM CDT -----  Regarding: Prescription  Contact: patient  Per phone call with patient, she stated the physician had allow her to try some samples of medication and now she would like to have a prescription for DIVIGEL 0.5 MG and she would like this to be called out to the pharmacy.  Please return call at [phone] (home).    Thanks,  SJ

## 2024-04-12 ENCOUNTER — PATIENT MESSAGE (OUTPATIENT)
Dept: OBSTETRICS AND GYNECOLOGY | Facility: CLINIC | Age: 64
End: 2024-04-12
Payer: COMMERCIAL

## 2024-06-03 NOTE — PROGRESS NOTES
"CC:  WELL WOMAN (menop/ hyst with BSO)  Patient Care Team:  Donte Thomas MD as PCP - General (Family Medicine)  Efren Reno Jr., MD (Gastroenterology)    Last visit with me was on  2023 for wwe    HPI:  Patient is a 63 y.o. who presents for her well woman exam today.  History reviewed with patient.   Patient is without complaints or concerns today. Moving back home to [place] at end of this month    Her hyst was for bleeding and endometriosis in "years ago"  HRT: past use of estrogen only and HRT: past use of BioTe pellets  HX ABNL PAPS: normal after cryo- yrs ago    REVIEW OF PRIOR DATA/ HEALTH MAINTENANCE:  LAST ANNUAL:   2023    LAST MMG (screening)- 2022-  [facility]    LAST COLONOSCOPY- - by Dr. Reno - q 10 yrs (neg)   LAST DEXA- never    Past Medical History:   Diagnosis Date    Anemia     Anosmia     ever since Covid in dec 2020    Anxiety and depression     Benign paroxysmal vertigo, bilateral     BMI 39.0-39.9,adult     Current smoker     Family history of breast cancer gene mutation in first degree relative     mom +mutation, breast ca at 32, mat aunt + for gene, breast ca at 40, sister neg for gene but +breast ca-patient has never been tested    history of endometriosis     Hypothyroid 2022    Migraines     exertional-if she doesnt move too quickly she does fine off her verapamil    RU (obstructive sleep apnea)     CPAP- setting 5     SURGICAL HX:   has a past surgical history that includes Cholecystectomy; Hernia repair; Bladder suspension; and Total abdominal hysterectomy w/ bilateral salpingoophorectomy.    SOCIAL HX:    reports that she has been smoking. She has never used smokeless tobacco. She reports current alcohol use. She reports that she does not use drugs.    Current Outpatient Medications   Medication Instructions    ascorbic acid (vitamin C) (VITAMIN C) 1,000 mg, Oral, Daily    DULoxetine (CYMBALTA) 60 MG capsule No dose, route, or frequency " "recorded.    fluticasone propionate (FLONASE) 50 mcg/actuation nasal spray 1 spray, Each Nostril, Daily    levothyroxine (SYNTHROID) 150 MCG tablet TAKE 1 TABLET BY MOUTH ONCE DAILY ONE HOUR BEFORE A MEAL    pantoprazole (PROTONIX) 40 mg, Oral, 2 times daily    vitamin D (VITAMIN D3) 1,000 Units, Oral, Daily    zinc gluconate 50 mg, Oral, Daily       VITALS:  Blood pressure 133/87, height 5' 8" (1.727 m), weight 122 kg (269 lb).  Body mass index is 40.9 kg/m².     PHYSICAL EXAM-  APPEARANCE: Well appearing, in no acute distress.   NECK: Neck symmetric   CV/PULM: No resp distress, normal resp effort   PSYCH:  Normal mood and affect, cooperative   SKIN: No rashes, lesions, or abnormal bruising   ABD: Soft, without tenderness or masses.    BREAST: No skin changes or nipple dc.  No palpable masses or tenderness  PELVIC:  VULVA: Normal female genitalia. No lesions.   URETHRAL MEATUS: No masses, no significant prolapse.  BLADDER/ URETHRA: No masses or suprapubic tenderness   VAGINA/ CUFF: No lesions. +atrophic changes. No discharge   PELVIS: No masses, tenderness, or fullness on bimanual exam   ANUS/ PERINEUM: Normal tone.  No lesions.          *patient verbally consented for exam and female chaperone present for entire exam     ASSESSMENT and PLAN:  Encounter for well woman exam with routine gynecological exam  -     Liquid-based pap smear, screening    Breast cancer screening by mammogram  -     Mammo Digital Screening Bilat w/ Barney; Future; Expected date: 06/17/2024    Encounter for osteoporosis screening in asymptomatic postmenopausal patient  -     DXA Bone Density Axial Skeleton 1 or more sites; Future; Expected date: 06/17/2024       FOLLOWUP:  1 year for wwe or sooner prn    COUNSELING:  Patient was counseled today on recommendations for yearly pelvic exam, current Pap guidelines, self breast exams, annual screening mammograms, routine screening colonoscopy, and screening bone density. Reviewed calcium and vitamin " D supplements and weight bearing exercise to minimize risks.  Encouraged patient to see her PCP for other health maintenance.

## 2024-06-04 ENCOUNTER — OFFICE VISIT (OUTPATIENT)
Dept: OBSTETRICS AND GYNECOLOGY | Facility: CLINIC | Age: 64
End: 2024-06-04
Payer: COMMERCIAL

## 2024-06-04 VITALS
DIASTOLIC BLOOD PRESSURE: 87 MMHG | HEIGHT: 68 IN | SYSTOLIC BLOOD PRESSURE: 133 MMHG | BODY MASS INDEX: 40.77 KG/M2 | WEIGHT: 269 LBS

## 2024-06-04 DIAGNOSIS — Z78.0 ENCOUNTER FOR OSTEOPOROSIS SCREENING IN ASYMPTOMATIC POSTMENOPAUSAL PATIENT: ICD-10-CM

## 2024-06-04 DIAGNOSIS — Z13.820 ENCOUNTER FOR OSTEOPOROSIS SCREENING IN ASYMPTOMATIC POSTMENOPAUSAL PATIENT: ICD-10-CM

## 2024-06-04 DIAGNOSIS — Z01.419 ENCOUNTER FOR WELL WOMAN EXAM WITH ROUTINE GYNECOLOGICAL EXAM: Primary | ICD-10-CM

## 2024-06-04 DIAGNOSIS — Z12.31 BREAST CANCER SCREENING BY MAMMOGRAM: ICD-10-CM

## 2024-06-04 PROCEDURE — 1160F RVW MEDS BY RX/DR IN RCRD: CPT | Mod: CPTII,S$GLB,, | Performed by: OBSTETRICS & GYNECOLOGY

## 2024-06-04 PROCEDURE — 99396 PREV VISIT EST AGE 40-64: CPT | Mod: S$GLB,,, | Performed by: OBSTETRICS & GYNECOLOGY

## 2024-06-04 PROCEDURE — 3079F DIAST BP 80-89 MM HG: CPT | Mod: CPTII,S$GLB,, | Performed by: OBSTETRICS & GYNECOLOGY

## 2024-06-04 PROCEDURE — 99459 PELVIC EXAMINATION: CPT | Mod: S$GLB,,, | Performed by: OBSTETRICS & GYNECOLOGY

## 2024-06-04 PROCEDURE — 1159F MED LIST DOCD IN RCRD: CPT | Mod: CPTII,S$GLB,, | Performed by: OBSTETRICS & GYNECOLOGY

## 2024-06-04 PROCEDURE — 3075F SYST BP GE 130 - 139MM HG: CPT | Mod: CPTII,S$GLB,, | Performed by: OBSTETRICS & GYNECOLOGY

## 2024-06-04 PROCEDURE — 3008F BODY MASS INDEX DOCD: CPT | Mod: CPTII,S$GLB,, | Performed by: OBSTETRICS & GYNECOLOGY

## 2024-06-04 RX ORDER — PANTOPRAZOLE SODIUM 40 MG/1
40 TABLET, DELAYED RELEASE ORAL 2 TIMES DAILY
COMMUNITY
Start: 2024-05-22

## 2024-06-07 LAB — Lab: NORMAL
